# Patient Record
Sex: MALE | Race: WHITE
[De-identification: names, ages, dates, MRNs, and addresses within clinical notes are randomized per-mention and may not be internally consistent; named-entity substitution may affect disease eponyms.]

---

## 2020-02-19 ENCOUNTER — HOSPITAL ENCOUNTER (EMERGENCY)
Dept: HOSPITAL 33 - ED | Age: 50
Discharge: HOME | End: 2020-02-19
Payer: OTHER GOVERNMENT

## 2020-02-19 VITALS — SYSTOLIC BLOOD PRESSURE: 137 MMHG | DIASTOLIC BLOOD PRESSURE: 87 MMHG | HEART RATE: 66 BPM | OXYGEN SATURATION: 95 %

## 2020-02-19 DIAGNOSIS — M54.2: ICD-10-CM

## 2020-02-19 DIAGNOSIS — R51: Primary | ICD-10-CM

## 2020-02-19 DIAGNOSIS — E78.00: ICD-10-CM

## 2020-02-19 LAB
ALBUMIN SERPL-MCNC: 4.2 G/DL (ref 3.5–5)
ALP SERPL-CCNC: 80 U/L (ref 38–126)
ALT SERPL-CCNC: 45 U/L (ref 0–50)
ANION GAP SERPL CALC-SCNC: 11.4 MEQ/L (ref 5–15)
AST SERPL QL: 52 U/L (ref 17–59)
BILIRUB BLD-MCNC: 0.5 MG/DL (ref 0.2–1.3)
BUN SERPL-MCNC: 12 MG/DL (ref 9–20)
CALCIUM SPEC-MCNC: 9.2 MG/DL (ref 8.4–10.2)
CELLS COUNTED: 100
CHLORIDE SERPL-SCNC: 104 MMOL/L (ref 98–107)
CO2 SERPL-SCNC: 29 MMOL/L (ref 22–30)
CREAT SERPL-MCNC: 0.72 MG/DL (ref 0.66–1.25)
FLUAV AG NPH QL IA: NEGATIVE
FLUBV AG NPH QL IA: NEGATIVE
GLUCOSE SERPL-MCNC: 98 MG/DL (ref 74–106)
HCT VFR BLD AUTO: 44.1 % (ref 42–50)
HGB BLD-MCNC: 14.3 GM/DL (ref 12.5–18)
MANUAL DIF COMMENT BLD-IMP: NORMAL
MCH RBC QN AUTO: 29.1 PG (ref 26–32)
MCHC RBC AUTO-ENTMCNC: 32.4 G/DL (ref 32–36)
NEUTS BAND # BLD MANUAL: 6 % (ref 0–2)
PLATELET # BLD AUTO: 142 K/MM3 (ref 150–450)
POTASSIUM SERPLBLD-SCNC: 4.3 MMOL/L (ref 3.5–5.1)
PROT SERPL-MCNC: 7.7 G/DL (ref 6.3–8.2)
RBC # BLD AUTO: 4.92 M/MM3 (ref 4.1–5.6)
RSV AG SPEC QL IA: NEGATIVE
SODIUM SERPL-SCNC: 139 MMOL/L (ref 137–145)
TOXIC GRANULES BLD QL SMEAR: (no result)
VARIANT LYMPHS BLD QL SMEAR: 4 %
WBC # BLD AUTO: 6 K/MM3 (ref 4–10.5)

## 2020-02-19 PROCEDURE — 96372 THER/PROPH/DIAG INJ SC/IM: CPT

## 2020-02-19 PROCEDURE — 96374 THER/PROPH/DIAG INJ IV PUSH: CPT

## 2020-02-19 PROCEDURE — 70450 CT HEAD/BRAIN W/O DYE: CPT

## 2020-02-19 PROCEDURE — 85652 RBC SED RATE AUTOMATED: CPT

## 2020-02-19 PROCEDURE — 36415 COLL VENOUS BLD VENIPUNCTURE: CPT

## 2020-02-19 PROCEDURE — 85025 COMPLETE CBC W/AUTO DIFF WBC: CPT

## 2020-02-19 PROCEDURE — 94760 N-INVAS EAR/PLS OXIMETRY 1: CPT

## 2020-02-19 PROCEDURE — 87631 RESP VIRUS 3-5 TARGETS: CPT

## 2020-02-19 PROCEDURE — 86308 HETEROPHILE ANTIBODY SCREEN: CPT

## 2020-02-19 PROCEDURE — 36000 PLACE NEEDLE IN VEIN: CPT

## 2020-02-19 PROCEDURE — 80053 COMPREHEN METABOLIC PANEL: CPT

## 2020-02-19 PROCEDURE — 99284 EMERGENCY DEPT VISIT MOD MDM: CPT

## 2020-02-19 NOTE — XRAY
Indication: Headache and sinus pain.



Multiple contiguous axial images obtained through the head without contrast.



Comparison: June 14, 2013.



Again normal appearing brain parenchyma, ventricles, and bony calvarium.

Visualized paranasal sinuses and mastoid air cells are clear.



Impression: Continued normal CT head without contrast exam.

## 2021-07-27 ENCOUNTER — HOSPITAL ENCOUNTER (EMERGENCY)
Dept: HOSPITAL 33 - ED | Age: 51
Discharge: HOME | End: 2021-07-27
Payer: OTHER GOVERNMENT

## 2021-07-27 VITALS — HEART RATE: 76 BPM | DIASTOLIC BLOOD PRESSURE: 92 MMHG | SYSTOLIC BLOOD PRESSURE: 135 MMHG

## 2021-07-27 VITALS — OXYGEN SATURATION: 97 %
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
## 2021-07-27 DIAGNOSIS — S81.811A: Primary | ICD-10-CM
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
## 2021-07-27 PROCEDURE — 12002 RPR S/N/AX/GEN/TRNK2.6-7.5CM: CPT

## 2021-07-27 PROCEDURE — 96372 THER/PROPH/DIAG INJ SC/IM: CPT

## 2021-07-27 PROCEDURE — 99283 EMERGENCY DEPT VISIT LOW MDM: CPT

## 2021-07-27 RX ADMIN — LIDOCAINE HYDROCHLORIDE ONE ML: 10 INJECTION, SOLUTION INFILTRATION; PERINEURAL at 22:17

## 2021-07-27 RX ADMIN — BACITRACIN ZINC ONE GM: 500 OINTMENT TOPICAL at 22:20

## 2021-07-27 NOTE — ERPHSYRPT
- History of Present Illness


Time Seen by Provider: 07/27/21 22:00


Source: patient


Exam Limitations: no limitations


Patient Subjective Stated Complaint: pt states, "I cut my leg with sheet metal. 

It continued to ooze so I came in".


Triage Nursing Assessment: pt cut rt lateral lower leg on a piece of sheet metal

around noon today. It continues to ooze so pt came in to ER.  Laceration is 3cm 

L x 0.3cm W x 1.0 cm D.


Physician History: 


Patient is a 51-year-old male presents to our emergency department with a 

laceration to the lateral aspect of his right lower leg.  Patient stated 

lacerated his leg on sheet-metal.  Injury occurred just prior to arrival.  No 

associated numbness tingling or weakness.  Tetanus is up-to-date.  No active 

bleeding.  No other injuries reported.  Patient is otherwise healthy.  He voices

no other complaints or concerns at this time.





Timing/Duration: today


Severity: mild


Modifying Factors: Improves With: nothing


Associated Symptoms: denies symptoms


Allergies/Adverse Reactions: 








No Known Drug Allergies Allergy (Verified 07/27/21 22:12)


   





Home Medications: 








Aspirin 81 mg PO DAILY 07/24/14 [History]


Pravastatin Sodium 10 mg PO DAILY 07/02/15 [History]





Hx Tetanus, Diphtheria Vaccination/Date Given: Yes


Hx Influenza Vaccination/Date Given: No


Hx Pneumococcal Vaccination/Date Given: No


Immunizations Up to Date: Yes





Travel Risk





- International Travel


Have you traveled outside of the country in past 3 weeks: No





- Coronavirus Screening


Are you exhibiting any of the following symptoms?: No


Close contact with a COVID-19 positive Pt in past 14-21 Days: No





- Vaccine Status


Have you recieved a Covid-19 vaccination: Yes


: PT PAL





- Review of Systems


Constitutional: No Symptoms, No Fever, No Chills


Eyes: No Symptoms


Ears, Nose, & Throat: No Symptoms


Respiratory: No Symptoms, No Cough, No Dyspnea


Cardiac: No Symptoms, No Chest Pain, No Edema, No Syncope


Abdominal/Gastrointestinal: No Symptoms, No Abdominal Pain, No Nausea, No 

Vomiting, No Diarrhea


Genitourinary Symptoms: No Symptoms, No Dysuria


Musculoskeletal: No Symptoms, No Back Pain, No Neck Pain


Skin: No Symptoms, No Rash


Neurological: No Symptoms, No Dizziness, No Focal Weakness, No Sensory Changes


Psychological: No Symptoms


Endocrine: No Symptoms


Hematologic/Lymphatic: No Symptoms


Immunological/Allergic: No Symptoms


All Other Systems: Reviewed and Negative





- Past Medical History


Pertinent Past Medical History: Yes


Neurological History: No Pertinent History


ENT History: No Pertinent History


Cardiac History: High Cholesterol


Respiratory History: No Pertinent History


Endocrine Medical History: No Pertinent History


Musculoskeletal History: No Pertinent History


GI Medical History: No Pertinent History


 History: No Pertinent History


Psycho-Social History: No Pertinent History


Male Reproductive Disorders: No Pertinent History





- Past Surgical History


Past Surgical History: No


Neuro Surgical History: No Pertinent History


Cardiac: No Pertinent History


Respiratory: No Pertinent History


Gastrointestinal: No Pertinent History


Genitourinary: No Pertinent History


Musculoskeletal: No Pertinent History


Male Surgical History: Vasectomy





- Social History


Smoking Status: Never smoker


Exposure to second hand smoke: No


Drug Use: none


Patient Lives Alone: No





- Nursing Vital Signs


Nursing Vital Signs: 





                               Initial Vital Signs











Temperature  98.4 F   07/27/21 21:55


 


Pulse Rate  64   07/27/21 21:55


 


Respiratory Rate  18   07/27/21 21:55


 


Blood Pressure  125/90   07/27/21 21:55


 


O2 Sat by Pulse Oximetry  97   07/27/21 21:55








                                   Pain Scale











Pain Intensity                 0

















- Physical Exam


General Appearance: no apparent distress, alert


Eye Exam: PERRL/EOMI, eyes nml inspection


Ears, Nose, Throat Exam: normal ENT inspection, TMs normal, pharynx normal, 

moist mucous membranes


Neck Exam: normal inspection, non-tender, supple, full range of motion


Respiratory Exam: normal breath sounds, lungs clear, No respiratory distress


Cardiovascular Exam: regular rate/rhythm, normal heart sounds, normal peripheral

 pulses


Gastrointestinal/Abdomen Exam: soft, normal bowel sounds, No tenderness, No mass


Back Exam: normal inspection, normal range of motion, No CVA tenderness, No 

vertebral tenderness


Extremity Exam: normal inspection, normal range of motion, pelvis stable


Neurologic Exam: alert, oriented x 3, cooperative, normal mood/affect, nml 

cerebellar function, nml station & gait, sensation nml, No motor deficits


Skin Exam: normal color, warm, dry, No rash


Lymphatic Exam: No adenopathy


**SpO2 Interpretation**: normal


SpO2: 97


O2 Delivery: Room Air





Procedures





- Laceration/Wound Repair


  ** Right Lower Other


Time of Procedure: 22:28


Wound Location: Right


Wound Length (cm): 3


Wound's Depth, Shape: superficial


Wound Explored: clean


Irrigated: Yes


Hibiclens Prep: Yes


Anesthesia: local, 1% Lidocaine


Wound Debrided: minimal


Wound Repaired With: Staples


Number of Sutures: 7


Layer Closure?: No


Sterile Dressing Applied?: Yes


Splint Applied?: No


Sling Applied?: No


Progress: 


Neurovascular intact post post procedure.


07/27/21 22:29





Ordered Tests: 





                               Active Orders 24 hr











 Category Date Time Status


 


 Wound Care STAT Care  07/27/21 22:17 Active








Medication Summary














Discontinued Medications














Generic Name Dose Route Start Last Admin





  Trade Name Benjamin  PRN Reason Stop Dose Admin


 


Bacitracin Zinc  0.9 gm  07/27/21 22:17 





  Baciguent Packet***  TP  07/27/21 22:18 





  STAT ONE  


 


Lidocaine HCl  Confirm  07/27/21 22:02 





  Xylocaine 1% Hcl 20 Ml Mdv***  Administered  07/27/21 22:03 





  Dose  





  10 ml  





  .ROUTE  





  .STK-MED ONE  


 


Lidocaine HCl  10 ml  07/27/21 22:12  07/27/21 22:17





  Xylocaine 1% Hcl 20 Ml Mdv***  IJ  07/27/21 22:13  10 ml





  STAT ONE   Administration














- Progress


Progress: improved


Progress Note: 


Duration was repaired with 7 staples.  No complications.  No indication for 

antibiotics at this time.  Tetanus up-to-date.  Patient agrees to follow-up with

 primary care doctor within 48 hours for wound check.  Sutures may be removed in

 7 to 10 days.  All questions were answered.  Patient voices no other complaints

 or concerns at this time will discharge home.


07/27/21 22:29





Counseled pt/family regarding: diagnosis, need for follow-up





- Departure


Departure Disposition: Home


Clinical Impression: 


 Laceration





Condition: Stable


Critical Care Time: No


Instructions:  Wound Care (DC)


Additional Instructions: 


Discharge/Care Plan





UMA ACEVEDO was seen on 07/27/21 in the Emergency Room. The patient was

 counseled regarding Diagnosis,Lab results, Imaging studies, need for follow up 

and when to return to the Emergency Room.





Prescriptions given:





Discharge Note





I have spoken with the patient and/or caregivers. I have explained the patient's

 condition, diagnosis and treatment plan based on the information available to 

me at this time. I have answered the patient's and/or caregiver's questions and 

addressed any concerns. The patient and/or caregivers have as good understanding

 of the patient's diagnosis, condition and treatment plan as can be expected at 

this point. The vital signs have been stable. The patient's condition is stable 

and appropriate for discharge from the emergency department.





The patient will pursue further outpatient evaluation with the primary care 

physician or other designated or consulting physician as outlined in the 

discharge instructions. The patient and/or caregivers are agreeable to this plan

 of care and follow-up instructions have been explained in detail. The patient 

and/or caregivers have received these instruction. The patient/and or caregivers

 are aware that any significant change in condition or worsening of symptoms 

should prompt an immediate return to this or the closest emergency department or

 call 911.

## 2023-02-05 NOTE — ERPHSYRPT
- History of Present Illness


Time Seen by Provider: 02/19/20 13:24


Source: patient


Exam Limitations: no limitations


Physician History: 


 50-year-old white male presents with 3-day history of headache and neck pain 

as well as some photophobia.  Patient has not had any known exposure to 

individuals with viral or bacterial illness.  Patient only took Tylenol for his 

pain once yesterday.  He has had no measurable fevers.  He has had no nausea 

vomiting or diarrhea.  He has no abdominal pain.  Patient does not have a 

history of migraine headaches.  Does have some body aches.


Timing/Duration: day(s) (3), other (unchanged)


Quality: aching


Head Pain Location: global


Severity of Pain-Max: mild


Severity of Pain-Current: mild


Recent Head Trauma: no recent headache/trauma


Modifying Factors: Improves With: exposure to light, noise


Associated Symptoms: neck pain, sensitive to light


Previous symptoms: no prior history


Allergies/Adverse Reactions: 








No Known Drug Allergies Allergy (Verified 02/19/20 13:35)


 





Home Medications: 








Aspirin 81 mg PO DAILY 07/24/14 [History]


Pravastatin Sodium 10 mg PO DAILY 07/02/15 [History]





Hx Tetanus, Diphtheria Vaccination/Date Given:  (MORE THAN 5)


Hx Influenza Vaccination/Date Given: No


Hx Pneumococcal Vaccination/Date Given: No





- Review of Systems


Constitutional: No Symptoms


Eyes: Other (Sensitivity to light)


Ears, Nose, & Throat: No Symptoms


Respiratory: No Symptoms


Cardiac: No Symptoms


Abdominal/Gastrointestinal: No Symptoms


Genitourinary Symptoms: No Symptoms


Musculoskeletal: No Symptoms


Skin: No Symptoms


Neurological: No Symptoms


Psychological: No Symptoms


Endocrine: No Symptoms


Hematologic/Lymphatic: No Symptoms


Immunological/Allergic: No Symptoms


All Other Systems: Reviewed and Negative





- Past Medical History


Pertinent Past Medical History: Yes


Neurological History: No Pertinent History


ENT History: No Pertinent History


Cardiac History: High Cholesterol


Respiratory History: No Pertinent History


Endocrine Medical History: No Pertinent History


Musculoskeletal History: No Pertinent History


GI Medical History: No Pertinent History


 History: No Pertinent History


Psycho-Social History: No Pertinent History


Male Reproductive Disorders: No Pertinent History





- Past Surgical History


Past Surgical History: No


Neuro Surgical History: No Pertinent History


Cardiac: No Pertinent History


Respiratory: No Pertinent History


Gastrointestinal: No Pertinent History


Genitourinary: No Pertinent History


Musculoskeletal: No Pertinent History


Male Surgical History: Vasectomy





- Social History


Smoking Status: Never smoker


Exposure to second hand smoke: No


Drug Use: none


Patient Lives Alone: No





- Nursing Vital Signs


Nursing Vital Signs: 


 Initial Vital Signs











Temperature  98.5 F   02/19/20 13:37


 


Pulse Rate  74   02/19/20 13:37


 


Respiratory Rate  18   02/19/20 13:37


 


Blood Pressure  137/94   02/19/20 13:37


 


O2 Sat by Pulse Oximetry  100   02/19/20 13:37








 Pain Scale











Pain Intensity                 10

















- Physical Exam


General Appearance: no apparent distress, alert, anxiety


Eye Exam: PERRL/EOMI, eyes nml inspection


Ears, Nose, Throat Exam: normal ENT inspection, moist mucous membranes


Neck Exam: normal inspection, supple, full range of motion, other (No midline 

tenderness but tenderness on his bilateral paraspinous muscles.)


Respiratory Exam: normal breath sounds, lungs clear, airway intact, No chest 

tenderness, No respiratory distress


Cardiovascular Exam: regular rate/rhythm, normal heart sounds, normal 

peripheral pulses


Gastrointestinal/Abdominal Exam: soft, normal bowel sounds, No tenderness


Back Exam: normal inspection


Extremity Exam: normal inspection, normal range of motion, pelvis stable


Mental Status Exam: alert, oriented x 3, cooperative


CNs Exam: normal hearing, normal speech, PERRL, tongue midline


Coordination/Gait Exam: normal finger to nose, normal gait, normal cerebellar 

function


Motor/Sensory Exam: no motor deficit, no sensory deficit, no pronator drift


Skin Exam: normal color, warm, dry


Lymphatic Exam: No adenopathy


**SpO2 Interpretation**: normal


O2 Delivery: Room Air





- Course


Nursing assessment & vital signs reviewed: Yes


Ordered Tests: 


 Active Orders 24 hr











 Category Date Time Status


 


 IV Insertion STAT Care  02/19/20 14:14 Active


 


 Pulse Oximetry (ED) STAT Care  02/19/20 14:14 Active


 


 HEAD WITHOUT CONTRAST [CT] Stat Exams  02/19/20 14:14 Completed


 


 CBC W DIFF Stat Lab  02/19/20 14:14 Completed


 


 CMP Stat Lab  02/19/20 14:25 Completed


 


 Erythrocyte Sedimentation Rate Stat Lab  02/19/20 14:14 Completed


 


 Manual Differential NC Stat Lab  02/19/20 14:14 Completed


 


 Mono Screen Stat Lab  02/19/20 14:25 Completed








Medication Summary














Discontinued Medications














Generic Name Dose Route Start Last Admin





  Trade Name Freq  PRN Reason Stop Dose Admin


 


Hydromorphone HCl  1 mg  02/19/20 15:49  





  Hydromorphone 1 Mg/Ml Ampule***  IV  02/19/20 15:50  





  STAT ONE   





     





     





     





     


 


Promethazine HCl  25 mg  02/19/20 15:49  





  Phenergan 25 Mg Inj***  IM  02/19/20 15:50  





  STAT ONE   





     





     





     





     











Lab/Rad Data: 


 Laboratory Result Diagrams





 02/19/20 14:14 





 02/19/20 14:25 





 Laboratory Results











  02/19/20 02/19/20 02/19/20 Range/Units





  14:25 14:25 14:25 


 


WBC     (4.0-10.5)  K/mm3


 


RBC     (4.1-5.6)  M/mm3


 


Hgb     (12.5-18.0)  gm/dl


 


Hct     (42-50)  %


 


MCV     ()  fl


 


MCH     (26-32)  pg


 


MCHC     (32-36)  g/dl


 


RDW     (11.5-14.0)  %


 


Plt Count     (150-450)  K/mm3


 


MPV     (7.5-11.0)  fl


 


Segmented Neutrophils     (36.-66.)  %


 


Band Neutrophils     (0.0-2.0)  %


 


Lymphocytes (Manual)     (24-44)  %


 


Monocytes (Manual)     (0.0-12.0)  %


 


Eosinophils (Manual)     (0.00-3.0)  %


 


Metamyelocytes     %


 


Atypical Lymphocytes     %


 


Toxic Granulation     


 


Platelet Estimate     (NORMAL)  


 


RBC Morphology     


 


ESR     (0-15)  mm/hr


 


Sodium    139  (137-145)  mmol/L


 


Potassium    4.3  (3.5-5.1)  mmol/L


 


Chloride    104  ()  mmol/L


 


Carbon Dioxide    29  (22-30)  mmol/L


 


Anion Gap    11.4  (5-15)  MEQ/L


 


BUN    12  (9-20)  mg/dL


 


Creatinine    0.72  (0.66-1.25)  mg/dL


 


Estimated GFR    > 60.0  ML/MIN


 


Glucose    98  ()  mg/dL


 


Calcium    9.2  (8.4-10.2)  mg/dL


 


Total Bilirubin    0.50  (0.2-1.3)  mg/dL


 


AST    52  (17-59)  U/L


 


ALT    45  (0-50)  U/L


 


Alkaline Phosphatase    80  ()  U/L


 


Serum Total Protein    7.7  (6.3-8.2)  g/dL


 


Albumin    4.2  (3.5-5.0)  g/dL


 


Monoscreen   NEGATIVE   (Negative)  


 


Influenza Type A Ag  NEGATIVE    (NEGATIVE)  


 


Influenza Type B Ag  NEGATIVE    (NEGATIVE)  


 


RSV (PCR)  NEGATIVE    (Negative)  














  02/19/20 Range/Units





  14:14 


 


WBC  6.0  (4.0-10.5)  K/mm3


 


RBC  4.92  (4.1-5.6)  M/mm3


 


Hgb  14.3  (12.5-18.0)  gm/dl


 


Hct  44.1  (42-50)  %


 


MCV  89.6  ()  fl


 


MCH  29.1  (26-32)  pg


 


MCHC  32.4  (32-36)  g/dl


 


RDW  13.2  (11.5-14.0)  %


 


Plt Count  142 L  (150-450)  K/mm3


 


MPV  11.3 H  (7.5-11.0)  fl


 


Segmented Neutrophils  46  (36.-66.)  %


 


Band Neutrophils  6 H  (0.0-2.0)  %


 


Lymphocytes (Manual)  28  (24-44)  %


 


Monocytes (Manual)  13 H  (0.0-12.0)  %


 


Eosinophils (Manual)  2  (0.00-3.0)  %


 


Metamyelocytes  1  %


 


Atypical Lymphocytes  4  %


 


Toxic Granulation  1+  


 


Platelet Estimate  NORMAL  (NORMAL)  


 


RBC Morphology  NORMAL  


 


ESR  9  (0-15)  mm/hr


 


Sodium   (137-145)  mmol/L


 


Potassium   (3.5-5.1)  mmol/L


 


Chloride   ()  mmol/L


 


Carbon Dioxide   (22-30)  mmol/L


 


Anion Gap   (5-15)  MEQ/L


 


BUN   (9-20)  mg/dL


 


Creatinine   (0.66-1.25)  mg/dL


 


Estimated GFR   ML/MIN


 


Glucose   ()  mg/dL


 


Calcium   (8.4-10.2)  mg/dL


 


Total Bilirubin   (0.2-1.3)  mg/dL


 


AST   (17-59)  U/L


 


ALT   (0-50)  U/L


 


Alkaline Phosphatase   ()  U/L


 


Serum Total Protein   (6.3-8.2)  g/dL


 


Albumin   (3.5-5.0)  g/dL


 


Monoscreen   (Negative)  


 


Influenza Type A Ag   (NEGATIVE)  


 


Influenza Type B Ag   (NEGATIVE)  


 


RSV (PCR)   (Negative)  














- Progress


Progress: improved, re-examined


Air Movement: good


Progress Note: 





02/19/20 15:22


CAT scan of the head reveals no acute intracranial process.  There is no 

evidence of sinus disease


02/19/20 15:51


I reviewed the patient's test results.  Although I do not, clinically, feel the 

patient has meningitis, I told the patient several times that the only way to 

know for certain is to perform a lumbar puncture.  Patient does not want a 

lumbar puncture performed at this time.  He feels that he would like to try the 

pain medicine first and if his symptoms are not relieved or recur, then he will 

return to the emergency department.  I discussed the risks of not performing 

the lumbar puncture including worsening condition possible seizure and other 

neurologic maladies.  Patient states he understands and will sign a refusal of 

treatment at this time but will return if his symptoms worsen or recur.


Blood Culture(s) Obtained: No


Antibiotics given: No


Counseled pt/family regarding: lab results, diagnosis, need for follow-up, rad 

results





- Departure


Departure Disposition: Home


Clinical Impression: 


 Headache





Condition: Stable


Critical Care Time: No


Referrals: 


DOCTOR,NO FAMILY [Primary Care Provider] - 


Additional Instructions: 


Use Tylenol and ibuprofen for your headache and neck pain.  If symptoms do not 

improve or worsen return to the emergency room. Name band;

## 2023-05-10 NOTE — HP
DATE OF SURGERY:  05/11/2023



HISTORY OF PRESENT ILLNESS:  The patient is a 53-year-old male who presents with left 
inguinal hernia. He has left groin tightness and is seeing a bulge. He wrapped this up. It 
is worse with eating. Pain from work since he has been working and do not want to eat. He 
wears a tool belt and having it lay on it hurts.  



PAST MEDICAL HISTORY:  Hyperlipidemia. 



PAST SURGICAL HISTORY:  Cataracts.



ALLERGIES:  NKDA.



MEDICATIONS:  Atorvastatin. 



FAMILY HISTORY:   None. 



SOCIAL HISTORY:  Occasional alcohol.  



REVIEW OF SYSTEMS: CONSTITUTIONAL:  Denies fever or chills. CHEST: Denies shortness of 
breath. CVS: Denies chest pain. ABDOMEN: Denies abdominal pain.



PHYSICAL EXAMINATION:  

GENERAL:  No acute distress.  

CHEST: Nonlabored. No shortness of breath. 

CVS:  Regular rate and rhythm.

ABDOMEN: Soft. 



IMPRESSION:  Small left inguinal hernia. 



PLAN:  Left inguinal repair with possible mesh with Dr. Jamel Espinoza. 



As dictated by Briseida Burks NP.

## 2023-05-11 ENCOUNTER — HOSPITAL ENCOUNTER (OUTPATIENT)
Dept: HOSPITAL 33 - SDC | Age: 53
Discharge: HOME | End: 2023-05-11
Attending: SURGERY
Payer: OTHER GOVERNMENT

## 2023-05-11 VITALS — OXYGEN SATURATION: 94 % | HEART RATE: 66 BPM

## 2023-05-11 VITALS — DIASTOLIC BLOOD PRESSURE: 68 MMHG | SYSTOLIC BLOOD PRESSURE: 104 MMHG

## 2023-05-11 DIAGNOSIS — K40.90: Primary | ICD-10-CM

## 2023-05-11 LAB
ALBUMIN SERPL-MCNC: 4.5 G/DL (ref 3.5–5)
ALP SERPL-CCNC: 77 U/L (ref 38–126)
ALT SERPL-CCNC: 26 U/L (ref 0–50)
ANION GAP SERPL CALC-SCNC: 14.8 MEQ/L (ref 5–15)
AST SERPL QL: 34 U/L (ref 17–59)
BASOPHILS # BLD AUTO: 0.04 X10^3/UL (ref 0–0.4)
BASOPHILS NFR BLD AUTO: 0.7 % (ref 0–0.4)
BILIRUB BLD-MCNC: 0.6 MG/DL (ref 0.2–1.3)
BUN SERPL-MCNC: 14 MG/DL (ref 9–20)
CALCIUM SPEC-MCNC: 9 MG/DL (ref 8.4–10.2)
CHLORIDE SERPL-SCNC: 102 MMOL/L (ref 98–107)
CO2 SERPL-SCNC: 28 MMOL/L (ref 22–30)
CREAT SERPL-MCNC: 0.74 MG/DL (ref 0.66–1.25)
EOSINOPHIL # BLD AUTO: 0.11 X10^3/UL (ref 0–0.5)
GFR SERPLBLD BASED ON 1.73 SQ M-ARVRAT: > 60 ML/MIN
GLUCOSE SERPL-MCNC: 96 MG/DL (ref 74–106)
HCT VFR BLD AUTO: 47.1 % (ref 42–50)
HGB BLD-MCNC: 15.3 G/DL (ref 12.5–18)
IMM GRANULOCYTES # BLD: 0.02 X10^3U/L (ref 0–0.03)
IMM GRANULOCYTES NFR BLD: 0.3 % (ref 0–0.4)
LYMPHOCYTES # SPEC AUTO: 1.8 X10^3/UL (ref 1–4.6)
MCH RBC QN AUTO: 28.8 PG (ref 26–32)
MCHC RBC AUTO-ENTMCNC: 32.5 G/DL (ref 32–36)
MONOCYTES # BLD AUTO: 0.82 X10^3/UL (ref 0–1.3)
NRBC # BLD AUTO: 0 X10^3U/L (ref 0–0.01)
NRBC BLD AUTO-RTO: 0 % (ref 0–0.1)
PLATELET # BLD AUTO: 167 X10^3/UL (ref 150–450)
POTASSIUM SERPLBLD-SCNC: 4.7 MMOL/L (ref 3.5–5.1)
PROT SERPL-MCNC: 8 G/DL (ref 6.3–8.2)
RBC # BLD AUTO: 5.31 X10^6/UL (ref 4.1–5.6)
SODIUM SERPL-SCNC: 139 MMOL/L (ref 137–145)
WBC # BLD AUTO: 5.8 X10^3/UL (ref 4–10.5)

## 2023-05-11 PROCEDURE — 85025 COMPLETE CBC W/AUTO DIFF WBC: CPT

## 2023-05-11 PROCEDURE — 36415 COLL VENOUS BLD VENIPUNCTURE: CPT

## 2023-05-11 PROCEDURE — 80053 COMPREHEN METABOLIC PANEL: CPT

## 2023-05-11 NOTE — OP
SURGERY DATE/TIME:  05/11/2023  1223     



PREOPERATIVE DIAGNOSIS:     Symptomatic left inguinal hernia. 



POSTOPERATIVE DIAGNOSIS:  Symptomatic left inguinal hernia. 



PROCEDURE:    Left inguinal herniorrhaphy with mesh. 



SURGEON:        Jamel Espinoza M.D.



ASSISTANT:         Harrison Reyes M.D. 



ANESTHESIA:    General.



COMPLICATIONS:    None.



CONDITION:        Stable.



INDICATION:  The patient has symptomatic hernia left side marked preoperatively. 



DESCRIPTION OF PROCEDURE:  Taken to surgery. Routine prep and drape. Time out performed. 
0.25% Marcaine. Curvilinear incision. External oblique opened. A 6 inch indirect hernia 
sac highly ligated, good visualization with no sliding component. There was just a very 
small direct hernia. 1 x 4 mesh trimmed to size secured with 0 Prolene in Manuel's 
ligament-type fashion. The internal ring was one clamp tight. The ilioinguinal nerve and 
the cord were placed back in natural position. External oblique closed with 2-0 Vicryl. 
Ada fascia closed with 3-0 Vicryl. Skin closed with 4-0 Vicryl. Steri-Strips applied. 
Sterile dressing applied. The patient tolerated the procedure satisfactorily. Findings 
discussed with the family in the waiting room.